# Patient Record
Sex: MALE | Race: BLACK OR AFRICAN AMERICAN | NOT HISPANIC OR LATINO | URBAN - METROPOLITAN AREA
[De-identification: names, ages, dates, MRNs, and addresses within clinical notes are randomized per-mention and may not be internally consistent; named-entity substitution may affect disease eponyms.]

---

## 2022-02-02 DIAGNOSIS — Z01.818 PREOP EXAMINATION: Primary | ICD-10-CM

## 2022-02-02 DIAGNOSIS — N18.6 END STAGE RENAL DISEASE (CMS/HCC): ICD-10-CM

## 2022-02-04 ENCOUNTER — SOCIAL WORK (OUTPATIENT)
Dept: TRANSPLANT | Facility: HOSPITAL | Age: 57
End: 2022-02-04
Payer: COMMERCIAL

## 2022-03-03 ENCOUNTER — APPOINTMENT (OUTPATIENT)
Dept: LAB | Facility: HOSPITAL | Age: 57
End: 2022-03-03
Attending: TRANSPLANT SURGERY
Payer: COMMERCIAL

## 2022-03-03 ENCOUNTER — MULTIDISCIPLINARY VISIT (OUTPATIENT)
Dept: TRANSPLANT | Facility: HOSPITAL | Age: 57
End: 2022-03-03
Attending: SURGERY

## 2022-03-03 VITALS
SYSTOLIC BLOOD PRESSURE: 102 MMHG | HEIGHT: 67 IN | BODY MASS INDEX: 34.84 KG/M2 | WEIGHT: 222 LBS | RESPIRATION RATE: 12 BRPM | HEART RATE: 86 BPM | DIASTOLIC BLOOD PRESSURE: 72 MMHG

## 2022-03-03 DIAGNOSIS — Z01.818 PRE-TRANSPLANT EVALUATION FOR KIDNEY TRANSPLANT: Primary | ICD-10-CM

## 2022-03-03 DIAGNOSIS — Z01.818 PRE-TRANSPLANT EVALUATION FOR KIDNEY TRANSPLANT: ICD-10-CM

## 2022-03-03 LAB
ABO + RH BLD: NORMAL
ALBUMIN SERPL-MCNC: 3.9 G/DL (ref 3.4–5)
ALP SERPL-CCNC: 68 IU/L (ref 35–126)
ALT SERPL-CCNC: 21 IU/L (ref 16–63)
ANION GAP SERPL CALC-SCNC: 20 MEQ/L (ref 3–15)
AST SERPL-CCNC: 17 IU/L (ref 15–41)
BASOPHILS # BLD: 0.06 K/UL (ref 0.01–0.1)
BASOPHILS NFR BLD: 0.6 %
BILIRUB SERPL-MCNC: 0.5 MG/DL (ref 0.3–1.2)
BUN SERPL-MCNC: 59 MG/DL (ref 8–20)
CALCIUM SERPL-MCNC: 10.3 MG/DL (ref 8.9–10.3)
CALCIUM SERPL-MCNC: 10.3 MG/DL (ref 8.9–10.3)
CHLORIDE SERPL-SCNC: 93 MEQ/L (ref 98–109)
CHOLEST SERPL-MCNC: 288 MG/DL
CO2 SERPL-SCNC: 25 MEQ/L (ref 22–32)
CREAT SERPL-MCNC: 6.8 MG/DL (ref 0.8–1.3)
D AG BLD QL: POSITIVE
DIFFERENTIAL METHOD BLD: ABNORMAL
EOSINOPHIL # BLD: 0.64 K/UL (ref 0.04–0.54)
EOSINOPHIL NFR BLD: 6.7 %
ERYTHROCYTE [DISTWIDTH] IN BLOOD BY AUTOMATED COUNT: 16.9 % (ref 11.6–14.4)
GFR SERPL CREATININE-BSD FRML MDRD: 10.3 ML/MIN/1.73M*2
GLUCOSE SERPL-MCNC: 83 MG/DL (ref 70–99)
HCT VFR BLDCO AUTO: 33 % (ref 40.1–51)
HDLC SERPL-MCNC: 87 MG/DL
HDLC SERPL: 3.3 {RATIO}
HGB BLD-MCNC: 11.4 G/DL (ref 13.7–17.5)
IMM GRANULOCYTES # BLD AUTO: 0.05 K/UL (ref 0–0.08)
IMM GRANULOCYTES NFR BLD AUTO: 0.5 %
INR PPP: 1.1
LABORATORY COMMENT REPORT: NORMAL
LDLC SERPL CALC-MCNC: 178 MG/DL
LYMPHOCYTES # BLD: 2.25 K/UL (ref 1.2–3.5)
LYMPHOCYTES NFR BLD: 23.7 %
MCH RBC QN AUTO: 28.7 PG (ref 28–33.2)
MCHC RBC AUTO-ENTMCNC: 34.5 G/DL (ref 32.2–36.5)
MCV RBC AUTO: 83.1 FL (ref 83–98)
MONOCYTES # BLD: 0.9 K/UL (ref 0.3–1)
MONOCYTES NFR BLD: 9.5 %
NEUTROPHILS # BLD: 5.6 K/UL (ref 1.7–7)
NEUTS SEG NFR BLD: 59 %
NONHDLC SERPL-MCNC: 201 MG/DL
NRBC BLD-RTO: 0 %
PDW BLD AUTO: 11 FL (ref 9.4–12.4)
PLATELET # BLD AUTO: 236 K/UL (ref 150–350)
POTASSIUM SERPL-SCNC: 4.2 MEQ/L (ref 3.6–5.1)
PROT SERPL-MCNC: 7.9 G/DL (ref 6–8.2)
PROTHROMBIN TIME: 13.4 SEC (ref 12.2–14.5)
PSA SERPL-MCNC: 0.73 NG/ML
PTH-INTACT SERPL-MCNC: 789.7 PG/ML (ref 12–88)
RBC # BLD AUTO: 3.97 M/UL (ref 4.5–5.8)
SODIUM SERPL-SCNC: 138 MEQ/L (ref 136–144)
TRIGL SERPL-MCNC: 116 MG/DL (ref 30–149)
WBC # BLD AUTO: 9.5 K/UL (ref 3.8–10.5)

## 2022-03-03 PROCEDURE — 86706 HEP B SURFACE ANTIBODY: CPT

## 2022-03-03 PROCEDURE — 36415 COLL VENOUS BLD VENIPUNCTURE: CPT

## 2022-03-03 PROCEDURE — 86704 HEP B CORE ANTIBODY TOTAL: CPT

## 2022-03-03 PROCEDURE — 85025 COMPLETE CBC W/AUTO DIFF WBC: CPT

## 2022-03-03 PROCEDURE — 83970 ASSAY OF PARATHORMONE: CPT

## 2022-03-03 PROCEDURE — 86480 TB TEST CELL IMMUN MEASURE: CPT

## 2022-03-03 PROCEDURE — 86803 HEPATITIS C AB TEST: CPT

## 2022-03-03 PROCEDURE — 86787 VARICELLA-ZOSTER ANTIBODY: CPT

## 2022-03-03 PROCEDURE — 86900 BLOOD TYPING SEROLOGIC ABO: CPT

## 2022-03-03 PROCEDURE — 85610 PROTHROMBIN TIME: CPT

## 2022-03-03 PROCEDURE — 87340 HEPATITIS B SURFACE AG IA: CPT

## 2022-03-03 PROCEDURE — 86645 CMV ANTIBODY IGM: CPT

## 2022-03-03 PROCEDURE — 80307 DRUG TEST PRSMV CHEM ANLYZR: CPT

## 2022-03-03 PROCEDURE — G0480 DRUG TEST DEF 1-7 CLASSES: HCPCS

## 2022-03-03 PROCEDURE — 80061 LIPID PANEL: CPT

## 2022-03-03 PROCEDURE — 87389 HIV-1 AG W/HIV-1&-2 AB AG IA: CPT

## 2022-03-03 PROCEDURE — 86665 EPSTEIN-BARR CAPSID VCA: CPT

## 2022-03-03 PROCEDURE — 80053 COMPREHEN METABOLIC PANEL: CPT

## 2022-03-03 PROCEDURE — 86592 SYPHILIS TEST NON-TREP QUAL: CPT

## 2022-03-03 PROCEDURE — 86644 CMV ANTIBODY: CPT

## 2022-03-03 PROCEDURE — 84153 ASSAY OF PSA TOTAL: CPT

## 2022-03-03 RX ORDER — GABAPENTIN 100 MG/1
100 CAPSULE ORAL NIGHTLY
COMMUNITY
Start: 2022-01-24

## 2022-03-03 RX ORDER — METOPROLOL SUCCINATE 100 MG/1
TABLET, EXTENDED RELEASE ORAL
COMMUNITY
Start: 2021-12-09

## 2022-03-03 RX ORDER — SUCROFERRIC OXYHYDROXIDE 500 MG/1
TABLET, CHEWABLE ORAL
COMMUNITY
Start: 2022-02-03 | End: 2022-04-28

## 2022-03-03 RX ORDER — NIFEDIPINE 30 MG/1
30 TABLET, EXTENDED RELEASE ORAL DAILY
COMMUNITY
Start: 2022-01-18 | End: 2022-05-18

## 2022-03-03 NOTE — PROGRESS NOTES
CC: End Stage Renal Disease for renal transplant evaluation    HPI:    Mr. Elaine Holguin is a 56 y.o. male with a hx of ESRD secondary to HTN.  Patient is seen today for evaluation for renal transplant. Patient is on hemodialysis.  He started dialysis about a month ago when he was hospitalized with COVID.  He was diagnosed with kidney disease in 2014.   Otherwise no changes to his health status.  He makes a normal amount of urine daily.     His past medical history is otherwise unremarkable.  No past medical history.        Past Medical History:   Diagnosis Date   • COVID-19 vaccine series started     1 dose as of 3/3/22   • ESRD (end stage renal disease) (CMS/[facility])    • History of anemia due to CKD    • Hypertensive nephropathy    • Pneumonia due to COVID-19 virus        No past surgical history on file.      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    Tobacco Use   • Smoking status: Never Smoker   • Smokeless tobacco: Not on file   Substance and Sexual Activity   • Alcohol use: Not Currently   • Drug use: Never   • Sexual activity: Not on file   Other Topics Concern   • Not on file   Social History Narrative   • Not on file     Social Determinants of Health     Financial Resource Strain: Not on file   Food Insecurity: Not on file   Transportation Needs: Not on file   Physical Activity: Not on file   Stress: Not on file   Social Connections: Not on file   Intimate Partner Violence: Not on file   Housing Stability: Not on file       No Known Allergies      Elaine Holguin had no medications administered during this  visit.      Review of Systems   Constitutional: Negative for  chills, and fever. No Fatigue  HENT: Negative for congestion, dental problem, ear discharge, ear pain, facial swelling, hearing loss and mouth sores.    Eyes: Negative for pain, discharge, itching and visual disturbance.   Respiratory: Negative for cough, chest tightness, shortness of breath, wheezing and stridor.    Cardiovascular: Negative for chest pain, palpitations and leg swelling.   Gastrointestinal: Negative for abdominal distention, abdominal pain, blood in stool, constipation, diarrhea, nausea and vomiting.   Genitourinary: Negative for decreased urine volume, difficulty urinating, dysuria, flank pain and hematuria.   Musculoskeletal: Negative for back pain, joint swelling and neck pain.   Skin: Negative for color change, pallor, rash and wound.   Neurological: Negative for dizziness, seizures, syncope, weakness and headaches.   Hematological: Does not bruise/bleed easily.   Psychiatric/Behavioral: Negative for agitation, behavioral problems, confusion and decreased concentration.       Physical Exam    Vitals:    03/03/22 1054   BP: 102/72   Pulse: 86   Resp: 12       Physical Exam   Constitutional: He appears well-developed and well-nourished. No distress.   HENT:   Head: Normocephalic and atraumatic.   Right Ear: External ear normal.   Left Ear: External ear normal.   Eyes: Conjunctivae and EOM are normal. Right eye exhibits no discharge. Left eye exhibits no discharge.   Neck: Normal range of motion. Neck supple. No tracheal deviation present.   Cardiovascular: Normal rate, regular rhythm, normal heart sounds and strong distal pulses.  Exam reveals no gallop and no friction rub.  No carotid bruits  No murmur heard.  Pulmonary/Chest: Effort normal and breath sounds normal. No stridor. No respiratory distress. he has no wheezes. He has no rales. he exhibits no tenderness.   Abdominal: Soft. he exhibits no mass. There is no tenderness. There is  no rebound and no guarding.   Musculoskeletal: Normal range of motion. he exhibits no edema or deformity.   Neurological: he is alert. No cranial nerve deficit or sensory deficit. Coordination normal.   Skin: Skin is warm and dry. Capillary refill takes less than 2 seconds. No rash noted. he  is not diaphoretic. No erythema. No pallor.   Psychiatric: he has a normal mood and affect. his behavior is normal. Judgment and thought content normal.       Assessment and Plan     Elaine Holguin is a 56 y.o. male with ESRD 2/2 HTN here today for renal transplant evaluation.      At this time he is likely a great candidate for transplant.  He had an echocardiogram earlier this year that should a normal EF.  He will need a stress test and standard work up with cancer screening.       I have reviewed the surgical procedure and its associated risks, national and center specific outcomes for recipients, alternative treatments for the kidney transplant recipients and the right to refuse transplantation. Furthermore we discussed the use of HepB core Antibody donors, DCD donors, PHS High Risk Donors, and KDPI > 85% donors.  Given his age, I would not advocate for high KDPI kidneys for him at this time.  It was a pleasure to see Elaine Holguin in clinic today. If you have any questions or concerns, please feel free to contact me.

## 2022-03-03 NOTE — PROGRESS NOTES
I met with the patient and his wife saad about living donation.  We discussed the advantages of living donor transplant, the donor surgery, recovery period, and evaluation process. I provided educational material on donation and my contact info.

## 2022-03-04 LAB
EBV NA 1 IGG SER-ACNC: 5.77
EBV VCA IGG SER IA-ACNC: 3.83
EBV VCA IGM SER IA-ACNC: 0.09
HBV CORE AB SER QL: REACTIVE
HBV SURFACE AB SER QL: REACTIVE
HBV SURFACE AG SER QL: NONREACTIVE
HCV AB SER QL: NONREACTIVE
HIV 1+2 AB+HIV1 P24 AG SERPL QL IA: NONREACTIVE
INTERPRETATION: ABNORMAL
RPR SER QL: NORMAL
SCAN RESULT - ADL: NORMAL
SCAN RESULT - ADL: NORMAL
VZV IGG SER-ACNC: 622 AU/ML

## 2022-03-07 LAB
M TB IFN-G BLD-IMP: NEGATIVE
MITOGEN-NIL: >10 IU/ML
NIL: 0.02 IU/ML
TB AG-NIL: 0.01 IU/ML
TB2 AG - NIL: 0.01 IU/ML

## 2022-03-08 ENCOUNTER — COMMITTEE REVIEW (OUTPATIENT)
Dept: TRANSPLANT | Facility: HOSPITAL | Age: 57
End: 2022-03-08
Payer: COMMERCIAL

## 2022-03-08 NOTE — COMMITTEE REVIEW
Patient Discussion Note     Evaluation Date: 3/3/2022  Committee Review Date: 3/8/2022    Organ being evaluated for: Kidney    Transplant Phase: Evaluation  Transplant Status: Active    EPTS: 21 at 3/8/2022  2:14 PM  Calculated from:  Age: 56 years  Has Diabetes: No  Prior solid organ transplant: No  On dialysis: No    Transplant Coordinator: Shirley Mcmullen  Transplant Surgeon:       Referring Physician: Miky Floewr    Primary Diagnosis: Hypertensive Nephrosclerosis  Secondary Diagnosis:     Committee Review Members:  Nephrology Jonathon Ortiz MD    Carolyne García   Transplant Maribell Lopez, Shirley Mcmullen, ALEJANDRA, Liza Fleming RN, OTTO Brady, Bria Munoz   Transplant Surgery Gaudencio Humphreys MD     Nephrology, , Transplant, Transplant Surgery      Transplant Eligibility: Irreversible renal failure defined as GFR < 20cc/minute/1.73m2, Willing to undergo evaluation, Willingness to comply with medical managment important to health    Committee Review Decision: Approved List Status 7    Relative Contraindications: None    Absolute Contraindications: None    Committee Discussion Details: NOD. Routine testing, cards, stress, routine cancer screenings prior to Stat 1 listing.

## 2022-03-09 DIAGNOSIS — Z01.818 PRE-TRANSPLANT EVALUATION FOR KIDNEY TRANSPLANT: Primary | ICD-10-CM

## 2022-03-09 LAB
CMV IGG SERPL IA-ACNC: 0.21
CMV IGM SERPL IA-ACNC: 0.15

## 2022-03-11 ENCOUNTER — TELEPHONE (OUTPATIENT)
Dept: TRANSPLANT | Facility: HOSPITAL | Age: 57
End: 2022-03-11
Payer: COMMERCIAL

## 2022-03-11 RX ORDER — CALCITRIOL 0.5 UG/1
1 CAPSULE ORAL EVERY OTHER DAY
COMMUNITY
End: 2022-04-28

## 2022-03-11 NOTE — TELEPHONE ENCOUNTER
Informed pt of HBcAB positivity and need for PCR testing. I also informed him that PTH is high. He tells me he thinks he is receiving therapy for this at dialysis. HD confirms he is taking calcitriol 1 mcg at every session. Med rec updated. .

## 2022-03-16 ENCOUNTER — TELEPHONE (OUTPATIENT)
Dept: CARDIOLOGY | Facility: CLINIC | Age: 57
End: 2022-03-16

## 2022-03-16 NOTE — TELEPHONE ENCOUNTER
I called pt and left vm letting him know that Dr Henderson's 1st available for a NPV is  07/05 @ 1:00pm. I aksed pt to c/b to conf if this is ok for him.

## 2022-04-07 NOTE — TELEPHONE ENCOUNTER
Patient returning Kims call. Patient accepts the appt 7/5/22 at 1:00pm     Patient can be reached at [phone]

## 2022-04-27 ENCOUNTER — TELEPHONE (OUTPATIENT)
Dept: TRANSPLANT | Facility: HOSPITAL | Age: 57
End: 2022-04-27
Payer: COMMERCIAL

## 2022-04-28 ENCOUNTER — OFFICE VISIT (OUTPATIENT)
Dept: COLORECTAL SURGERY | Facility: CLINIC | Age: 57
End: 2022-04-28
Payer: COMMERCIAL

## 2022-04-28 ENCOUNTER — HOSPITAL ENCOUNTER (OUTPATIENT)
Dept: RADIOLOGY | Facility: HOSPITAL | Age: 57
Discharge: HOME | End: 2022-04-28
Attending: TRANSPLANT SURGERY

## 2022-04-28 ENCOUNTER — APPOINTMENT (OUTPATIENT)
Dept: LAB | Facility: HOSPITAL | Age: 57
End: 2022-04-28
Attending: TRANSPLANT SURGERY

## 2022-04-28 ENCOUNTER — OFFICE VISIT (OUTPATIENT)
Dept: CARDIOLOGY | Facility: CLINIC | Age: 57
End: 2022-04-28
Payer: COMMERCIAL

## 2022-04-28 ENCOUNTER — HOSPITAL ENCOUNTER (OUTPATIENT)
Facility: HOSPITAL | Age: 57
End: 2022-04-28
Attending: COLON & RECTAL SURGERY | Admitting: COLON & RECTAL SURGERY
Payer: COMMERCIAL

## 2022-04-28 VITALS — HEIGHT: 70 IN | BODY MASS INDEX: 32.35 KG/M2 | WEIGHT: 226 LBS

## 2022-04-28 VITALS
SYSTOLIC BLOOD PRESSURE: 98 MMHG | WEIGHT: 226.8 LBS | DIASTOLIC BLOOD PRESSURE: 64 MMHG | HEIGHT: 69 IN | HEART RATE: 93 BPM | RESPIRATION RATE: 16 BRPM | OXYGEN SATURATION: 98 % | BODY MASS INDEX: 33.59 KG/M2

## 2022-04-28 DIAGNOSIS — Z12.11 COLON CANCER SCREENING: Primary | ICD-10-CM

## 2022-04-28 DIAGNOSIS — E78.5 HYPERLIPIDEMIA, UNSPECIFIED HYPERLIPIDEMIA TYPE: ICD-10-CM

## 2022-04-28 DIAGNOSIS — Z01.818 PRE-TRANSPLANT EVALUATION FOR KIDNEY TRANSPLANT: ICD-10-CM

## 2022-04-28 DIAGNOSIS — Z76.82 KIDNEY TRANSPLANT CANDIDATE: ICD-10-CM

## 2022-04-28 DIAGNOSIS — N18.6 ESRD ON DIALYSIS (CMS/HCC): ICD-10-CM

## 2022-04-28 DIAGNOSIS — I12.9 HYPERTENSIVE NEPHROPATHY: Primary | ICD-10-CM

## 2022-04-28 DIAGNOSIS — I70.0 AORTIC CALCIFICATION (CMS/HCC): ICD-10-CM

## 2022-04-28 DIAGNOSIS — I51.7 LEFT VENTRICULAR HYPERTROPHY: ICD-10-CM

## 2022-04-28 DIAGNOSIS — Z99.2 ESRD ON DIALYSIS (CMS/HCC): ICD-10-CM

## 2022-04-28 PROCEDURE — 3008F BODY MASS INDEX DOCD: CPT | Performed by: INTERNAL MEDICINE

## 2022-04-28 PROCEDURE — 99202 OFFICE O/P NEW SF 15 MIN: CPT

## 2022-04-28 PROCEDURE — 76775 US EXAM ABDO BACK WALL LIM: CPT

## 2022-04-28 PROCEDURE — 36415 COLL VENOUS BLD VENIPUNCTURE: CPT

## 2022-04-28 PROCEDURE — 99204 OFFICE O/P NEW MOD 45 MIN: CPT | Performed by: INTERNAL MEDICINE

## 2022-04-28 PROCEDURE — 86900 BLOOD TYPING SEROLOGIC ABO: CPT

## 2022-04-28 PROCEDURE — 3008F BODY MASS INDEX DOCD: CPT

## 2022-04-28 PROCEDURE — 71046 X-RAY EXAM CHEST 2 VIEWS: CPT

## 2022-04-28 RX ORDER — LIDOCAINE AND PRILOCAINE 25; 25 MG/G; MG/G
CREAM TOPICAL
COMMUNITY
Start: 2022-03-18 | End: 2022-04-28

## 2022-04-28 RX ORDER — VIT C/E/ZN/COPPR/LUTEIN/ZEAXAN 250MG-90MG
10000 CAPSULE ORAL DAILY
Qty: 90 CAPSULE | Refills: 3 | COMMUNITY
Start: 2022-04-28

## 2022-04-28 RX ORDER — CINACALCET 30 MG/1
TABLET, FILM COATED ORAL
COMMUNITY
Start: 2022-03-31 | End: 2022-04-28

## 2022-04-28 RX ORDER — HYDROCHLOROTHIAZIDE 50 MG/1
50 TABLET ORAL DAILY
COMMUNITY
End: 2022-04-28

## 2022-04-28 NOTE — PROGRESS NOTES
HISTORY & PHYSICAL EXAM  CC: Colonoscopy    HPI: Elaine is a very pleasant 56 year old male with a past medical history of CKD stage 4 and hypertensive nephropathy,  who is here for a screening colonoscopy. He comes today with his mohinder wife, Bella. He was referred to us by his transplant team and this will be his first colonoscopy. He reports he has 1-2 BM/day well formed and with good control. He denies N/V, bloating, abdominal pain, constipation, diarrhea, rectal pain, rectal bleeding, unintentional weight loss, fecal and urinary incontinence.      Past Medical History:   Diagnosis Date   • COVID-19 vaccine series started     1 dose as of 3/3/22   • ESRD (end stage renal disease) (CMS/[facility])    • History of anemia due to CKD    • Hypertensive nephropathy    • Pneumonia due to COVID-19 virus      History reviewed. No pertinent surgical history.    Current Outpatient Medications:   •  calcitrioL (ROCALTROL) 0.5 mcg capsule, Take 1 mcg by mouth every other day., Disp: , Rfl:   •  cinacalcet (SENSIPAR) 30 mg tablet, , Disp: , Rfl:   •  gabapentin (NEURONTIN) 100 mg capsule, Take 100 mg by mouth nightly., Disp: , Rfl:   •  hydrochlorothiazide (HYDRODIURIL) 50 mg tablet, Take 50 mg by mouth daily., Disp: , Rfl:   •  lidocaine-prilocaine (EMLA) cream, APPLY SMALL AMOUNT TO ACCESS SITE (AVF) 1 TO 2 HOURS BEFORE DIALYSIS. COVER WITH OCCLUSIVE DRESSING (SARAN WRAP), Disp: , Rfl:   •  metoprolol succinate XL (TOPROL-XL) 100 mg 24 hr tablet, TAKE ONE TABLET BY MOUTH ONCE DAILY FOR BLOOD PRESSURE, Disp: , Rfl:   •  NIFEdipine XL (PROCARDIA XL) 30 mg 24 hr tablet, Take 30 mg by mouth daily., Disp: , Rfl:   •  sucroferric oxyhydroxide (VELPHORO) 500 mg tablet,chewable, CRUSH OR CHEW AND SWALLOW 1 TABLET 3 TIMES A DAY WITH MEALS, Disp: , Rfl:    No Known Allergies  Social History     Socioeconomic History   • Marital status:      Spouse name: Not on file   • Number of children: Not on file   • Years of education: Not on  file   • Highest education level: Not on file   Occupational History   • Occupation: SSD     Comment: former   at  retirment home    Tobacco Use   • Smoking status: Never Smoker   • Smokeless tobacco: Not on file   Substance and Sexual Activity   • Alcohol use: Not Currently   • Drug use: Never   • Sexual activity: Not on file   Other Topics Concern   • Not on file   Social History Narrative   • Not on file     Social Determinants of Health     Financial Resource Strain: Not on file   Food Insecurity: Not on file   Transportation Needs: Not on file   Physical Activity: Not on file   Stress: Not on file   Social Connections: Not on file   Intimate Partner Violence: Not on file   Housing Stability: Not on file      Family History   Problem Relation Age of Onset   • Stroke Biological Mother    • Throat cancer Biological Father    Patient notes his Brother was diagnosed with cancer,but he is not sure what type.     REVIEW OF SYSTEMS: Pertinent positives and negatives as mentioned in HPI.     PHYSICAL EXAM:  GEN: On examination the patient is resting comfortably.  NEURO/PSYCH: Alert and oriented ×3  HEENT: Eyes: Sclera anicteric  CHEST: Equal rise and fall the chest.  No tenderness bilaterally.  SKIN: Warm and moist  NODES: No groin or cervical lymphadenopathy  EXT: No palpable edema of the bilateral lower extremities.  No clubbing.  GI: Abdomen soft, nontender, nondistended.  No palpable liver or spleen edge.  No ventral hernias, mass, or tenderness.  RECTAL: Deferred until time of colonoscopy.       ASSESSMENT/PLAN:     Colon cancer screening  We have made arrangements to get him scheduled for a colonoscopy today. We went over the risks of the procedure, including bleeding and perforation, he understood and wishes to proceed.

## 2022-04-28 NOTE — LETTER
"2022     Jamie Jerome MD  [address]  [place], [facility] [address]  Kidney Transplant Program  [facility] [address]    Patient: Elaine Holguin  YOB: 1965  Date of Visit: 2022      Dear Dr. Jerome:    Thank you for referring Elaine Holguin to me for evaluation. Below are my notes for this consultation.    If you have questions, please do not hesitate to call me. I look forward to following your patient along with you.         Sincerely,        Jesse Hicks,         CC: Elmer Mercedes, DO Miky Flower, Jesse Lo DO  2022 11:01 PM  Signed       Jesse Hicks D.O., [facility]    Clinical Cardiology and Heart Failure     [facility] HEART [facility]  [facility]  [facility] Level  [address] [place] [address]     TEL  [phone]  [facility]       2022    Re:  Elaine Holguin  : 1965    Dear Leo Jerome MD:    Thank you for your referral of Elaine Holguin for pre-operative cardiovascular evaluation for the kidney transplant team.    \"Ace\" presented for his initial cardiovascular evaluation as he prepares for kidney transplant.  He follows with his nephrologist Miky Flower and his PCP/cardiologist Elmer Mercedes.  He has a history of hypertension, Covid-19 infection in 2022, and anemia of CKD.  He also has a history of ESRD secondary to hypertensive nephrosclerosis.  He started dialysis in 2022 when he was hospitalized with COVID.      He is NYHA class I.  He notes being sedentary since starting in dialysis earlier this year.  He denies angina or breathlessness with usual activity level.  He notes that his blood pressure has been low at times post dialysis.  He denies orthopnea, paroxysmal nocturnal dyspnea, abdominal bloating, peripheral edema, palpitations, presyncope, syncope, stroke, TIA or claudication.    PAST MEDICAL HISTORY:  All aspects of this " documentation have been updated and/or entered into our EHR.  hypertension, Covid-19 infection in 2022, anemia of CKD, ESRD secondary to hypertensive nephrosclerosis, left upper extremity fistula.    SOCIAL HISTORY: He is  to his wife Bella. He has no children.  He is on disability.  He formerly worked at the  at a [employer].  He never smoked tobacco.  He does not drink alcohol.    FAMILY HISTORY: His mother is  from a stroke.  His father is  from an unknown type of cancer.  He has 5 brothers, 3 of which are  from unknown causes and other 2 with unknown medical history.  He also has 3 sisters who all are alive with unknown medical history.    MEDICATIONS:     Outpatient Encounter Medications as of 2022:   •  cholecalciferol (VITAMIN D3) 250 mcg (10,000 unit) capsule, Take 1 capsule (10,000 Units total) by mouth daily.  •  gabapentin (NEURONTIN) 100 mg capsule, Take 100 mg by mouth nightly.  •  metoprolol succinate XL (TOPROL-XL) 100 mg 24 hr tablet, TAKE ONE TABLET BY MOUTH ONCE DAILY FOR BLOOD PRESSURE  •  NIFEdipine XL (PROCARDIA XL) 30 mg 24 hr tablet, Take 30 mg by mouth daily.  •  [DISCONTINUED] calcitrioL (ROCALTROL) 0.5 mcg capsule, Take 1 mcg by mouth every other day.  •  [DISCONTINUED] cinacalcet (SENSIPAR) 30 mg tablet,   •  [DISCONTINUED] hydrochlorothiazide (HYDRODIURIL) 50 mg tablet, Take 50 mg by mouth daily.  •  [DISCONTINUED] lidocaine-prilocaine (EMLA) cream, APPLY SMALL AMOUNT TO ACCESS SITE (AVF) 1 TO 2 HOURS BEFORE DIALYSIS. COVER WITH OCCLUSIVE DRESSING (SARAN WRAP)  •  [DISCONTINUED] sucroferric oxyhydroxide (VELPHORO) 500 mg tablet,chewable, CRUSH OR CHEW AND SWALLOW 1 TABLET 3 TIMES A DAY WITH MEALS    ALLERGIES: No known drug allergies    REVIEW OF SYSTEMS: Aside from what is mentioned above, a 14 point review of systems was performed and was negative.    PHYSICAL EXAMINATION:  Visit Vitals  BP 98/64   Pulse 93   Resp 16   Ht  "1.753 m (5' 9\")   Wt 103 kg (226 lb 12.8 oz)   SpO2 98%   BMI 33.49 kg/m²   Body surface area is 2.24 meters squared.   MAP 75 mmHg  General: Pleasant and in no acute distress.  HEENT: No corneal arcus or xanthelasmas.  Sclerae are anicteric.  Nares patent.  Mucous membranes moist.  Neck: Supple.  JVP is 5 cm/H2O.  Carotids are equal with no audible bruits.  No lymphadenopathy or thyromegaly.  Heart: Regular.  Normal S1 and S2.  No S4. No S3. No murmur.  Chest: Symmetrical. Right upper chest dialysis catheter.  Lungs: Clear bilaterally without rales, wheezes nor rhonchi.  Abdomen: Soft, nontender.  No masses or bruits.  No organomegaly.  Normal bowel sounds.  Extremities: No cyanosis, clubbing or edema.  Distal pulses are easily palpable.  Left forearm AV fistula without palpable thrill or audible bruit,  Skin: Warm and dry and well perfused.  Neurologic: Alert and oriented ×3.  Cranial nerves II through XII are intact.  Psychiatric: Normal mood, affect & judgment.    DIAGNOSTIC DATA:    EKG: Sinus rhythm, early repolarization.    Labs:   Lab Results   Component Value Date     03/03/2022    K 4.2 03/03/2022    BUN 59 (H) 03/03/2022    CREATININE 6.8 (HH) 03/03/2022    EGFR 10.3 (L) 03/03/2022    WBC 9.50 03/03/2022    HGB 11.4 (L) 03/03/2022     03/03/2022    AST 17 03/03/2022    ALT 21 03/03/2022    GLUCOSE 83 03/03/2022    INR 1.1 03/03/2022       Lipid Profile:   Lab Results   Component Value Date    CHOL 288 (H) 03/03/2022     Lab Results   Component Value Date    TRIG 116 03/03/2022     Lab Results   Component Value Date    HDL 87 03/03/2022     Lab Results   Component Value Date    LDLCALC 178 (H) 03/03/2022       Echocardiogram 1/12/2022: Performed at [facility]  Left Ventricle: Normal left ventricular size. Moderate left ventricular hypertrophy. Normal left ventricular systolic function. LV Ejection Fraction was 68 %.   Diastolic Function: Diastolic function indeterminate.   Right " Ventricle: Normal right ventricular systolic function. Normal right ventricular size. TAPSE 1.9 cm.   Left Atrium: The left atrium is normal in size.   Right Atrium: The right atrium is normal in size.   Atrial Septum: Not well visualized.   Mitral Valve: Mild mitral annular calcification. Trivial mitral regurgitation.   Aortic Valve: Trileaflet aortic valve. Aortic cusps appear mildly calcified. Mild aortic regurgitation.   Tricuspid Valve: Normal appearance of the tricuspid valve. There is mild tricuspid regurgitation.   Tricuspid regurgitation peak pressure gradient was 19 mmHg.   Pulmonic Valve: Normal appearance of the pulmonic valve. There is trace pulmonic regurgitation.   Pericardium: Small pericardial effusion. The pericardial effusion was circumferential. No echocardiographic evidence to suggest pericardial tamponade.   Aorta: All visualized segments of Aorta normal.   IVC: Normal size IVC with respiratory collapse consistent with a right atrial pressure of 3 mmHg. IVC diameter 1.0 cm.       IMPRESSION/RECOMMENDATIONS:  1. Preprocedural cardiovascular risk stratification prior to kidney transplantation - Ace has a history of hypertension and ESRD on dialysis since January 2022.  He has no history of CAD/MI, CVA or DM.  He is NYHA class I.  His echocardiogram from January 2022 showed preserved LV systolic function.  He is acceptable risk for listing.  I will arrainge for an exercise nuclear stress test. He will not take metoprolol on the evening prior to testing.    2. Hypertension - his blood pressure is on the low side but his MAP is > 70 mmHg.  He is on metoprolol and nifedipine.  He is no longer on hydrochlorothiazide.  This is followed by Miky Flower and Elmer Mercedes.   3. Moderate left ventricular hypertrophy - this is due to long standing hypertension.    4. Mild aortic calcification - his echocardiogram in January 2022 showed his aortic cusps appear mildly calcified. He only has mild aortic  regurgitation.   5. Covid-19 infection in 2022 - he fortunately did not require intubation.    6. Anemia of CKD - he previously was on Velphoro.  This is followed by Miky Flower.  7. ESRD on dialysis - this is secondary to hypertensive nephrosclerosis.  He started dialysis in 2022 when he was hospitalized with COVID.  This is followed by Miky Flower.    Leo, thank you for allowing me to share in the care of your patient.   I asked Ace to return in 1 year for in-house transplant cardiac clearance but to follow with his primary cardiologist, Dr. Elmer Mercedes, for all other cardiac needs.  If you have any further questions, please do not hesitate to contact me.    Sincerely,    OTTO Espinoza / Jesse Hicks D.O., [facility]    I, Jesse Hicks D.O., personally saw and examined the patient and helped to formulate the plan outlined above. I reviewed available interim notes, studies, labs and communications where appropriate.  Edits were made where appropriate.                        Alyssa Reynolds CRNP  2022  8:54 PM  Incomplete       Jesse Hicks D.O., [facility]    Clinical Cardiology and Heart Failure     [facility] HEART [facility]  The Heart [facility] Level  [address] [place] [address]     TEL  [phone]  [facility]       2022    Re:  Elaine Holguin  : 1965    Dear ***:    ***    PAST MEDICAL HISTORY:  All aspects of this documentation have been updated and/or entered into our EHR. ***     SOCIAL HISTORY: ***    FAMILY HISTORY: ***    MEDICATIONS:     Outpatient Encounter Medications as of 2022:   •  calcitrioL (ROCALTROL) 0.5 mcg capsule, Take 1 mcg by mouth every other day.  •  gabapentin (NEURONTIN) 100 mg capsule, Take 100 mg by mouth nightly.  •  metoprolol succinate XL (TOPROL-XL) 100 mg 24 hr tablet, TAKE ONE TABLET BY MOUTH ONCE DAILY FOR BLOOD  PRESSURE  •  NIFEdipine XL (PROCARDIA XL) 30 mg 24 hr tablet, Take 30 mg by mouth daily.  •  sucroferric oxyhydroxide (VELPHORO) 500 mg tablet,chewable, CRUSH OR CHEW AND SWALLOW 1 TABLET 3 TIMES A DAY WITH MEALS    ALLERGIES: ***    REVIEW OF SYSTEMS: Aside from what is mentioned above, a 14 point review of systems was performed and was negative.    PHYSICAL EXAMINATION:  There were no vitals taken for this visit.There is no height or weight on file to calculate BSA.  General: Pleasant and in no acute distress.  HEENT: No corneal arcus or xanthelasmas.  Sclerae are anicteric.  Nares patent.  Mucous membranes moist.  Neck: Supple.  JVP is *** cm/H2O.  Carotids are equal with no audible bruits.  No lymphadenopathy or thyromegaly.  Heart: Regular.  Normal S1 and S2.  No S4. No S3. No murmur.  Chest: Symmetrical.  Lungs: Clear bilaterally without rales, wheezes nor rhonchi.  Abdomen: Soft, nontender.  No masses or bruits.  No organomegaly.  Normal bowel sounds.  Extremities: No cyanosis, clubbing or edema.  Distal pulses are easily palpable.  Skin: Warm and dry and well perfused.  Neurologic: Alert and oriented ×3.  Cranial nerves II through XII are intact.  Psychiatric: Normal mood, affect & judgment.    DIAGNOSTIC DATA:    EKG: ***    Labs:   Lab Results   Component Value Date     03/03/2022    K 4.2 03/03/2022    BUN 59 (H) 03/03/2022    CREATININE 6.8 (HH) 03/03/2022    EGFR 10.3 (L) 03/03/2022    WBC 9.50 03/03/2022    HGB 11.4 (L) 03/03/2022     03/03/2022    AST 17 03/03/2022    ALT 21 03/03/2022    GLUCOSE 83 03/03/2022    INR 1.1 03/03/2022       Lipid Profile:   Lab Results   Component Value Date    CHOL 288 (H) 03/03/2022     Lab Results   Component Value Date    TRIG 116 03/03/2022     Lab Results   Component Value Date    HDL 87 03/03/2022     Lab Results   Component Value Date    LDLCALC 178 (H) 03/03/2022           IMPRESSION/RECOMMENDATIONS:  8. Preprocedural cardiovascular risk  stratification prior to kidney transplantation  9.     ***, thank you for allowing me to share in the care of your patient.  I asked ***  to return in ***.  If you have any further questions, please do not hesitate to contact me.    Sincerely,    Jesse Hicks D.O., [facility]

## 2022-04-28 NOTE — PATIENT INSTRUCTIONS
You presented for your initial cardiovascular evaluation for the kidney transplant team.  You had an echocardiogram in January 2022.  Schedule an Exercise Stress Myoview soon. Do not take your metoprolol on the evening/night before your testing.  We will call you with the results.  Return in 1 year Joe

## 2022-04-28 NOTE — LETTER
"2022     Jamie Jerome MD  [address]  [place], [facility] [address]  Kidney Transplant Program  [facility] [address]    Patient: Elaine Holguin  YOB: 1965  Date of Visit: 2022      Dear Dr. Jerome:    Thank you for referring Elaine Holguin to me for evaluation. Below are my notes for this consultation.    If you have questions, please do not hesitate to call me. I look forward to following your patient along with you.         Sincerely,        Jesse Hicks,         CC: Elmer Mercedes, DO Miky Flower, Jesse Lo DO  2022 11:01 PM  Signed       Jesse Hicks D.O., [facility]    Clinical Cardiology and Heart Failure     [facility]ART [facility]  [facility]  [facility] Level  [address] [place] [address]     TEL  [phone]  [facility]       2022    Re:  Elaine Holguin  : 1965    Dear Leo Jerome MD:    Thank you for your referral of Elaine Holguin for pre-operative cardiovascular evaluation for the kidney transplant team.    \"Ace\" presented for his initial cardiovascular evaluation as he prepares for kidney transplant.  He follows with his nephrologist Miky Flower and his PCP/cardiologist Elmer Mercedes.  He has a history of hypertension, Covid-19 infection in 2022, and anemia of CKD.  He also has a history of ESRD secondary to hypertensive nephrosclerosis.  He started dialysis in 2022 when he was hospitalized with COVID.      He is NYHA class I.  He notes being sedentary since starting in dialysis earlier this year.  He denies angina or breathlessness with usual activity level.  He notes that his blood pressure has been low at times post dialysis.  He denies orthopnea, paroxysmal nocturnal dyspnea, abdominal bloating, peripheral edema, palpitations, presyncope, syncope, stroke, TIA or claudication.    PAST MEDICAL HISTORY:  All aspects of this " documentation have been updated and/or entered into our EHR.  hypertension, Covid-19 infection in 2022, anemia of CKD, ESRD secondary to hypertensive nephrosclerosis, left upper extremity fistula.    SOCIAL HISTORY: He is  to his wife Bella. He has no children.  He is on disability.  He formerly worked at the  at a [employer].  He never smoked tobacco.  He does not drink alcohol.    FAMILY HISTORY: His mother is  from a stroke.  His father is  from an unknown type of cancer.  He has 5 brothers, 3 of which are  from unknown causes and other 2 with unknown medical history.  He also has 3 sisters who all are alive with unknown medical history.    MEDICATIONS:     Outpatient Encounter Medications as of 2022:   •  cholecalciferol (VITAMIN D3) 250 mcg (10,000 unit) capsule, Take 1 capsule (10,000 Units total) by mouth daily.  •  gabapentin (NEURONTIN) 100 mg capsule, Take 100 mg by mouth nightly.  •  metoprolol succinate XL (TOPROL-XL) 100 mg 24 hr tablet, TAKE ONE TABLET BY MOUTH ONCE DAILY FOR BLOOD PRESSURE  •  NIFEdipine XL (PROCARDIA XL) 30 mg 24 hr tablet, Take 30 mg by mouth daily.  •  [DISCONTINUED] calcitrioL (ROCALTROL) 0.5 mcg capsule, Take 1 mcg by mouth every other day.  •  [DISCONTINUED] cinacalcet (SENSIPAR) 30 mg tablet,   •  [DISCONTINUED] hydrochlorothiazide (HYDRODIURIL) 50 mg tablet, Take 50 mg by mouth daily.  •  [DISCONTINUED] lidocaine-prilocaine (EMLA) cream, APPLY SMALL AMOUNT TO ACCESS SITE (AVF) 1 TO 2 HOURS BEFORE DIALYSIS. COVER WITH OCCLUSIVE DRESSING (SARAN WRAP)  •  [DISCONTINUED] sucroferric oxyhydroxide (VELPHORO) 500 mg tablet,chewable, CRUSH OR CHEW AND SWALLOW 1 TABLET 3 TIMES A DAY WITH MEALS    ALLERGIES: No known drug allergies    REVIEW OF SYSTEMS: Aside from what is mentioned above, a 14 point review of systems was performed and was negative.    PHYSICAL EXAMINATION:  Visit Vitals  BP 98/64   Pulse 93   Resp 16   Ht  "1.753 m (5' 9\")   Wt 103 kg (226 lb 12.8 oz)   SpO2 98%   BMI 33.49 kg/m²   Body surface area is 2.24 meters squared.   MAP 75 mmHg  General: Pleasant and in no acute distress.  HEENT: No corneal arcus or xanthelasmas.  Sclerae are anicteric.  Nares patent.  Mucous membranes moist.  Neck: Supple.  JVP is 5 cm/H2O.  Carotids are equal with no audible bruits.  No lymphadenopathy or thyromegaly.  Heart: Regular.  Normal S1 and S2.  No S4. No S3. No murmur.  Chest: Symmetrical. Right upper chest dialysis catheter.  Lungs: Clear bilaterally without rales, wheezes nor rhonchi.  Abdomen: Soft, nontender.  No masses or bruits.  No organomegaly.  Normal bowel sounds.  Extremities: No cyanosis, clubbing or edema.  Distal pulses are easily palpable.  Left forearm AV fistula without palpable thrill or audible bruit,  Skin: Warm and dry and well perfused.  Neurologic: Alert and oriented ×3.  Cranial nerves II through XII are intact.  Psychiatric: Normal mood, affect & judgment.    DIAGNOSTIC DATA:    EKG: Sinus rhythm, early repolarization.    Labs:   Lab Results   Component Value Date     03/03/2022    K 4.2 03/03/2022    BUN 59 (H) 03/03/2022    CREATININE 6.8 (HH) 03/03/2022    EGFR 10.3 (L) 03/03/2022    WBC 9.50 03/03/2022    HGB 11.4 (L) 03/03/2022     03/03/2022    AST 17 03/03/2022    ALT 21 03/03/2022    GLUCOSE 83 03/03/2022    INR 1.1 03/03/2022       Lipid Profile:   Lab Results   Component Value Date    CHOL 288 (H) 03/03/2022     Lab Results   Component Value Date    TRIG 116 03/03/2022     Lab Results   Component Value Date    HDL 87 03/03/2022     Lab Results   Component Value Date    LDLCALC 178 (H) 03/03/2022       Echocardiogram 1/12/2022: Performed at [facility]  Left Ventricle: Normal left ventricular size. Moderate left ventricular hypertrophy. Normal left ventricular systolic function. LV Ejection Fraction was 68 %.   Diastolic Function: Diastolic function indeterminate.   Right " Ventricle: Normal right ventricular systolic function. Normal right ventricular size. TAPSE 1.9 cm.   Left Atrium: The left atrium is normal in size.   Right Atrium: The right atrium is normal in size.   Atrial Septum: Not well visualized.   Mitral Valve: Mild mitral annular calcification. Trivial mitral regurgitation.   Aortic Valve: Trileaflet aortic valve. Aortic cusps appear mildly calcified. Mild aortic regurgitation.   Tricuspid Valve: Normal appearance of the tricuspid valve. There is mild tricuspid regurgitation.   Tricuspid regurgitation peak pressure gradient was 19 mmHg.   Pulmonic Valve: Normal appearance of the pulmonic valve. There is trace pulmonic regurgitation.   Pericardium: Small pericardial effusion. The pericardial effusion was circumferential. No echocardiographic evidence to suggest pericardial tamponade.   Aorta: All visualized segments of Aorta normal.   IVC: Normal size IVC with respiratory collapse consistent with a right atrial pressure of 3 mmHg. IVC diameter 1.0 cm.       IMPRESSION/RECOMMENDATIONS:  1. Preprocedural cardiovascular risk stratification prior to kidney transplantation - Ace has a history of hypertension and ESRD on dialysis since January 2022.  He has no history of CAD/MI, CVA or DM.  He is NYHA class I.  His echocardiogram from January 2022 showed preserved LV systolic function.  He is acceptable risk for listing.  I will arrainge for an exercise nuclear stress test. He will not take metoprolol on the evening prior to testing.    2. Hypertension - his blood pressure is on the low side but his MAP is > 70 mmHg.  He is on metoprolol and nifedipine.  He is no longer on hydrochlorothiazide.  This is followed by Miky Flower and Elmer Mercedes.   3. Moderate left ventricular hypertrophy - this is due to long standing hypertension.    4. Mild aortic calcification - his echocardiogram in January 2022 showed his aortic cusps appear mildly calcified. He only has mild aortic  regurgitation.   5. Covid-19 infection in January 2022 - he fortunately did not require intubation.    6. Anemia of CKD - he previously was on Velphoro.  This is followed by Miky Flower.  7. ESRD on dialysis - this is secondary to hypertensive nephrosclerosis.  He started dialysis in January 2022 when he was hospitalized with COVID.  This is followed by Miky Flower.    Leo, thank you for allowing me to share in the care of your patient.   I asked Ace to return in 1 year for in-house transplant cardiac clearance but to follow with his primary cardiologist, Dr. Elmer Mercedes, for all other cardiac needs.  If you have any further questions, please do not hesitate to contact me.    Sincerely,    OTTO Espinoza / Jesse Hicks D.O., [facility]    I, WILLEM Granger.O., personally saw and examined the patient and helped to formulate the plan outlined above. I reviewed available interim notes, studies, labs and communications where appropriate.  Edits were made where appropriate.                        Alyssa Reynolds CRNP  4/27/2022  8:54 PM  Signed  error

## 2022-04-28 NOTE — PROGRESS NOTES
"     Jesse Hicks D.O., [facility]    Clinical Cardiology and Heart Failure     [facility]     [facility]  The [facility] [address]  [address] [place] [address]     TEL  [phone]  [facility]       2022    Re:  Elaine Holguin  : 1965    Dear Leo Jerome MD:    Thank you for your referral of Elaine Holguin for pre-operative cardiovascular evaluation for the kidney transplant team.    \"Ace\" presented for his initial cardiovascular evaluation as he prepares for kidney transplant.  He follows with his nephrologist Miky Flower and his PCP/cardiologist Elmer Mercedes.  He has a history of hypertension, Covid-19 infection in 2022, and anemia of CKD.  He also has a history of ESRD secondary to hypertensive nephrosclerosis.  He started dialysis in 2022 when he was hospitalized with COVID.      He is NYHA class I.  He notes being sedentary since starting in dialysis earlier this year.  He denies angina or breathlessness with usual activity level.  He notes that his blood pressure has been low at times post dialysis.  He denies orthopnea, paroxysmal nocturnal dyspnea, abdominal bloating, peripheral edema, palpitations, presyncope, syncope, stroke, TIA or claudication.    PAST MEDICAL HISTORY:  All aspects of this documentation have been updated and/or entered into our EHR.  hypertension, Covid-19 infection in 2022, anemia of CKD, ESRD secondary to hypertensive nephrosclerosis, left upper extremity fistula.    SOCIAL HISTORY: He is  to his wife Bella. He has no children.  He is on disability.  He formerly worked at the  at a [employer].  He never smoked tobacco.  He does not drink alcohol.    FAMILY HISTORY: His mother is  from a stroke.  His father is  from an unknown type of cancer.  He has 5 brothers, 3 of which are  from unknown causes and other 2 with " "unknown medical history.  He also has 3 sisters who all are alive with unknown medical history.    MEDICATIONS:     Outpatient Encounter Medications as of 4/28/2022:   •  cholecalciferol (VITAMIN D3) 250 mcg (10,000 unit) capsule, Take 1 capsule (10,000 Units total) by mouth daily.  •  gabapentin (NEURONTIN) 100 mg capsule, Take 100 mg by mouth nightly.  •  metoprolol succinate XL (TOPROL-XL) 100 mg 24 hr tablet, TAKE ONE TABLET BY MOUTH ONCE DAILY FOR BLOOD PRESSURE  •  NIFEdipine XL (PROCARDIA XL) 30 mg 24 hr tablet, Take 30 mg by mouth daily.  •  [DISCONTINUED] calcitrioL (ROCALTROL) 0.5 mcg capsule, Take 1 mcg by mouth every other day.  •  [DISCONTINUED] cinacalcet (SENSIPAR) 30 mg tablet,   •  [DISCONTINUED] hydrochlorothiazide (HYDRODIURIL) 50 mg tablet, Take 50 mg by mouth daily.  •  [DISCONTINUED] lidocaine-prilocaine (EMLA) cream, APPLY SMALL AMOUNT TO ACCESS SITE (AVF) 1 TO 2 HOURS BEFORE DIALYSIS. COVER WITH OCCLUSIVE DRESSING (SARAN WRAP)  •  [DISCONTINUED] sucroferric oxyhydroxide (VELPHORO) 500 mg tablet,chewable, CRUSH OR CHEW AND SWALLOW 1 TABLET 3 TIMES A DAY WITH MEALS    ALLERGIES: No known drug allergies    REVIEW OF SYSTEMS: Aside from what is mentioned above, a 14 point review of systems was performed and was negative.    PHYSICAL EXAMINATION:  Visit Vitals  BP 98/64   Pulse 93   Resp 16   Ht 1.753 m (5' 9\")   Wt 103 kg (226 lb 12.8 oz)   SpO2 98%   BMI 33.49 kg/m²   Body surface area is 2.24 meters squared.   MAP 75 mmHg  General: Pleasant and in no acute distress.  HEENT: No corneal arcus or xanthelasmas.  Sclerae are anicteric.  Nares patent.  Mucous membranes moist.  Neck: Supple.  JVP is 5 cm/H2O.  Carotids are equal with no audible bruits.  No lymphadenopathy or thyromegaly.  Heart: Regular.  Normal S1 and S2.  No S4. No S3. No murmur.  Chest: Symmetrical. Right upper chest dialysis catheter.  Lungs: Clear bilaterally without rales, wheezes nor rhonchi.  Abdomen: Soft, nontender.  No " masses or bruits.  No organomegaly.  Normal bowel sounds.  Extremities: No cyanosis, clubbing or edema.  Distal pulses are easily palpable.  Left forearm AV fistula without palpable thrill or audible bruit,  Skin: Warm and dry and well perfused.  Neurologic: Alert and oriented ×3.  Cranial nerves II through XII are intact.  Psychiatric: Normal mood, affect & judgment.    DIAGNOSTIC DATA:    EKG: Sinus rhythm, early repolarization.    Labs:   Lab Results   Component Value Date     03/03/2022    K 4.2 03/03/2022    BUN 59 (H) 03/03/2022    CREATININE 6.8 (HH) 03/03/2022    EGFR 10.3 (L) 03/03/2022    WBC 9.50 03/03/2022    HGB 11.4 (L) 03/03/2022     03/03/2022    AST 17 03/03/2022    ALT 21 03/03/2022    GLUCOSE 83 03/03/2022    INR 1.1 03/03/2022       Lipid Profile:   Lab Results   Component Value Date    CHOL 288 (H) 03/03/2022     Lab Results   Component Value Date    TRIG 116 03/03/2022     Lab Results   Component Value Date    HDL 87 03/03/2022     Lab Results   Component Value Date    LDLCALC 178 (H) 03/03/2022       Echocardiogram 1/12/2022: Performed at [facility]  Left Ventricle: Normal left ventricular size. Moderate left ventricular hypertrophy. Normal left ventricular systolic function. LV Ejection Fraction was 68 %.   Diastolic Function: Diastolic function indeterminate.   Right Ventricle: Normal right ventricular systolic function. Normal right ventricular size. TAPSE 1.9 cm.   Left Atrium: The left atrium is normal in size.   Right Atrium: The right atrium is normal in size.   Atrial Septum: Not well visualized.   Mitral Valve: Mild mitral annular calcification. Trivial mitral regurgitation.   Aortic Valve: Trileaflet aortic valve. Aortic cusps appear mildly calcified. Mild aortic regurgitation.   Tricuspid Valve: Normal appearance of the tricuspid valve. There is mild tricuspid regurgitation.   Tricuspid regurgitation peak pressure gradient was 19 mmHg.   Pulmonic Valve:  Normal appearance of the pulmonic valve. There is trace pulmonic regurgitation.   Pericardium: Small pericardial effusion. The pericardial effusion was circumferential. No echocardiographic evidence to suggest pericardial tamponade.   Aorta: All visualized segments of Aorta normal.   IVC: Normal size IVC with respiratory collapse consistent with a right atrial pressure of 3 mmHg. IVC diameter 1.0 cm.       IMPRESSION/RECOMMENDATIONS:  1. Preprocedural cardiovascular risk stratification prior to kidney transplantation - Ace has a history of hypertension and ESRD on dialysis since January 2022.  He has no history of CAD/MI, CVA or DM.  He is NYHA class I.  His echocardiogram from January 2022 showed preserved LV systolic function.  He is acceptable risk for listing.  I will arrainge for an exercise nuclear stress test. He will not take metoprolol on the evening prior to testing.    2. Hypertension - his blood pressure is on the low side but his MAP is > 70 mmHg.  He is on metoprolol and nifedipine.  He is no longer on hydrochlorothiazide.  This is followed by Miky Flower and Elmer Mercedes.   3. Moderate left ventricular hypertrophy - this is due to long standing hypertension.    4. Mild aortic calcification - his echocardiogram in January 2022 showed his aortic cusps appear mildly calcified. He only has mild aortic regurgitation.   5. Covid-19 infection in January 2022 - he fortunately did not require intubation.    6. Anemia of CKD - he previously was on Velphoro.  This is followed by Miky Flower.  7. ESRD on dialysis - this is secondary to hypertensive nephrosclerosis.  He started dialysis in January 2022 when he was hospitalized with COVID.  This is followed by Miky Flower.    Leo, thank you for allowing me to share in the care of your patient.   I asked Ace to return in 1 year for in-house transplant cardiac clearance but to follow with his primary cardiologist, Dr. Elmer Mercedes, for all other  cardiac needs.  If you have any further questions, please do not hesitate to contact me.    Sincerely,    OTTO Espinoza / Jesse Hicks D.O., [facility]    IJesse D.O., personally saw and examined the patient and helped to formulate the plan outlined above. I reviewed available interim notes, studies, labs and communications where appropriate.  Edits were made where appropriate.

## 2022-04-28 NOTE — ASSESSMENT & PLAN NOTE
We have made arrangements to get him scheduled for a colonoscopy today. We went over the risks of the procedure, including bleeding and perforation, he understood and wishes to proceed.

## 2022-04-29 LAB
ABO + RH BLD: NORMAL
ANTI A1 LECTIN: POSITIVE
D AG BLD QL: POSITIVE
LABORATORY COMMENT REPORT: NORMAL

## 2022-05-06 ENCOUNTER — TELEPHONE (OUTPATIENT)
Dept: CARDIOLOGY | Facility: HOSPITAL | Age: 57
End: 2022-05-06
Payer: COMMERCIAL

## 2022-05-10 ENCOUNTER — HOSPITAL ENCOUNTER (OUTPATIENT)
Dept: CARDIOLOGY | Facility: HOSPITAL | Age: 57
Setting detail: NUCLEAR MEDICINE
Discharge: HOME | End: 2022-05-10
Attending: NURSE PRACTITIONER
Payer: COMMERCIAL

## 2022-05-10 VITALS
SYSTOLIC BLOOD PRESSURE: 142 MMHG | WEIGHT: 226 LBS | HEART RATE: 93 BPM | BODY MASS INDEX: 33.47 KG/M2 | HEIGHT: 69 IN | DIASTOLIC BLOOD PRESSURE: 76 MMHG | OXYGEN SATURATION: 100 % | RESPIRATION RATE: 16 BRPM

## 2022-05-10 DIAGNOSIS — Z76.82 KIDNEY TRANSPLANT CANDIDATE: ICD-10-CM

## 2022-05-10 LAB
CV NM TETROFOSMIN STRESS DOSE: 28.9 MCI
STRESS ANGINA INDEX: 0
STRESS BASELINE BP: NORMAL MMHG
STRESS BASELINE HR: 71 BPM
STRESS O2 SAT REST: 100 %
STRESS PERCENT HR: 104 %
STRESS POST ESTIMATED WORKLOAD: 10.4 METS
STRESS POST EXERCISE DUR MIN: 9 MIN
STRESS POST EXERCISE DUR SEC: 0 SEC
STRESS POST PEAK BP: NORMAL MMHG
STRESS POST PEAK HR: 171 BPM
STRESS TARGET HR: 140 BPM

## 2022-05-10 PROCEDURE — 93016 CV STRESS TEST SUPVJ ONLY: CPT | Performed by: INTERNAL MEDICINE

## 2022-05-10 PROCEDURE — A9502 TC99M TETROFOSMIN: HCPCS | Performed by: NURSE PRACTITIONER

## 2022-05-10 PROCEDURE — 93017 CV STRESS TEST TRACING ONLY: CPT

## 2022-05-10 PROCEDURE — 93018 CV STRESS TEST I&R ONLY: CPT | Performed by: INTERNAL MEDICINE

## 2022-05-10 PROCEDURE — 78451 HT MUSCLE IMAGE SPECT SING: CPT | Mod: 26 | Performed by: INTERNAL MEDICINE

## 2022-05-10 RX ADMIN — TETROFOSMIN 28.9 MILLICURIE: 1.38 INJECTION, POWDER, LYOPHILIZED, FOR SOLUTION INTRAVENOUS at 10:30

## 2022-05-10 NOTE — PROGRESS NOTES
"  Pharmacy Evaluation for Kidney Transplant Recipient    Wt Readings from Last 1 Encounters:   04/28/22 103 kg (226 lb 12.8 oz)             Ht Readings from Last 1 Encounters:   04/28/22 1.753 m (5' 9\")                 Current Outpatient Medications   Medication Sig Dispense Refill   • cholecalciferol (VITAMIN D3) 250 mcg (10,000 unit) capsule Take 1 capsule (10,000 Units total) by mouth daily. 90 capsule 3   • gabapentin (NEURONTIN) 100 mg capsule Take 100 mg by mouth nightly.     • metoprolol succinate XL (TOPROL-XL) 100 mg 24 hr tablet TAKE ONE TABLET BY MOUTH ONCE DAILY FOR BLOOD PRESSURE     • NIFEdipine XL (PROCARDIA XL) 30 mg 24 hr tablet Take 30 mg by mouth daily.       No current facility-administered medications for this visit.           No Known Allergies         Completed by Caty Gray PharmD   5/10/2022   10:24 AM    I have reviewed the patient's medication list and find no contraidications to  Transplant.    Reviewed by Caty Gray Pharm.D.  "

## 2022-05-17 NOTE — PROGRESS NOTES
"I have discussed the patient's care with the Resident. I have reviewed the patient's history and Resident's findings on exam, the patient's diagnosis/differential diagnosis and treatment plan. I concur with the treatment plan as documented by the Resident, except for my comments below or within additional notes today.  Nutritional Evaluation for Kidney Transplant Recipient    Wt Readings from Last 1 Encounters:   05/10/22 103 kg (226 lb)             Ht Readings from Last 1 Encounters:   05/10/22 1.753 m (5' 9\")           BMI Readings from Last 1 Encounters:   05/10/22 33.37 kg/m²                Lab Results   Component Value Date    CHOL 288 (H) 03/03/2022    TRIG 116 03/03/2022    HDL 87 03/03/2022    LDLCALC 178 (H) 03/03/2022       Recommendation:  No identified need for Nutritional Intervention at this time    Completed by:  JUANITA Hill   5/17/2022   2:06 PM                      "

## 2022-05-20 ENCOUNTER — TELEPHONE (OUTPATIENT)
Dept: COLORECTAL SURGERY | Facility: CLINIC | Age: 57
End: 2022-05-20
Payer: COMMERCIAL

## 2022-05-24 ENCOUNTER — APPOINTMENT (RX ONLY)
Dept: URBAN - METROPOLITAN AREA CLINIC 23 | Facility: CLINIC | Age: 57
Setting detail: DERMATOLOGY
End: 2022-05-24

## 2022-05-24 DIAGNOSIS — L28.0 LICHEN SIMPLEX CHRONICUS: ICD-10-CM

## 2022-05-24 DIAGNOSIS — L91.8 OTHER HYPERTROPHIC DISORDERS OF THE SKIN: ICD-10-CM

## 2022-05-24 DIAGNOSIS — L72.0 EPIDERMAL CYST: ICD-10-CM

## 2022-05-24 PROBLEM — D48.5 NEOPLASM OF UNCERTAIN BEHAVIOR OF SKIN: Status: ACTIVE | Noted: 2022-05-24

## 2022-05-24 PROCEDURE — ? BENIGN DESTRUCTION COSMETIC

## 2022-05-24 PROCEDURE — 99203 OFFICE O/P NEW LOW 30 MIN: CPT

## 2022-05-24 PROCEDURE — ? DEFER

## 2022-05-24 PROCEDURE — ? PRESCRIPTION

## 2022-05-24 PROCEDURE — ? ADDITIONAL NOTES

## 2022-05-24 PROCEDURE — ? TREATMENT REGIMEN

## 2022-05-24 PROCEDURE — ? COUNSELING

## 2022-05-24 RX ORDER — CLOBETASOL PROPIONATE 0.5 MG/G
1 OINTMENT TOPICAL
Qty: 45 | Refills: 1 | Status: ERX | COMMUNITY
Start: 2022-05-24

## 2022-05-24 RX ADMIN — CLOBETASOL PROPIONATE 1: 0.5 OINTMENT TOPICAL at 00:00

## 2022-05-24 ASSESSMENT — LOCATION DETAILED DESCRIPTION DERM
LOCATION DETAILED: LEFT SUPERIOR CENTRAL MALAR CHEEK
LOCATION DETAILED: LEFT LATERAL SUPERIOR TARSAL REGION
LOCATION DETAILED: LEFT INFERIOR POSTAURICULAR SKIN
LOCATION DETAILED: RIGHT INFERIOR POSTAURICULAR SKIN

## 2022-05-24 ASSESSMENT — LOCATION SIMPLE DESCRIPTION DERM
LOCATION SIMPLE: SCALP
LOCATION SIMPLE: LEFT CHEEK
LOCATION SIMPLE: LEFT LATERAL SUPERIOR TARSAL REGION

## 2022-05-24 ASSESSMENT — LOCATION ZONE DERM
LOCATION ZONE: FACE
LOCATION ZONE: EYELID
LOCATION ZONE: SCALP

## 2022-05-24 NOTE — PROCEDURE: BENIGN DESTRUCTION COSMETIC
Detail Level: Detailed
Consent: The patient's consent was obtained including but not limited to risks of crusting, scabbing, blistering, scarring, darker or lighter pigmentary change, recurrence, incomplete removal and infection.
Price (Use Numbers Only, No Special Characters Or $): 0
Post-Care Instructions: I reviewed with the patient in detail post-care instructions. Patient is to wear sunprotection, and avoid picking at any of the treated lesions. Pt may apply Vaseline to crusted or scabbing areas.
Anesthesia Volume In Cc: 0.3

## 2022-05-24 NOTE — PROCEDURE: TREATMENT REGIMEN
Detail Level: Detailed
Initiate Treatment: clobetasol 0.05 % ointment, Apply to affected areas twice daily x 4 weeks, then discontinue for 2 weeks

## 2022-05-24 NOTE — PROCEDURE: DEFER
Instructions (Optional): Dr. Mcclure, oculoplastic surgeon in [place] Instructions (Optional): Dr. Mcclure, oculoplastic surgeon in [place]

## 2022-06-02 ENCOUNTER — TELEPHONE (OUTPATIENT)
Dept: CARDIOLOGY | Facility: CLINIC | Age: 57
End: 2022-06-02
Payer: COMMERCIAL

## 2022-06-02 NOTE — TELEPHONE ENCOUNTER
I called and spoke with Ace.  I confirmed that he is up to date on testing from a cardiovascular standpoint for the kidney transplant team.  He had an echo in Jan 2022 at [facility] and the recent stress test on 5/10/2022.  I faxed a copy of the stress test result to Dr. Mercedes.  If Dr. Mercedes requires additional testing that would be at his discretion.  I confirmed that the transplant team had requested Dr. Mercedes's last office visit note and a copy of his prior stress testing and echo but this has not been received.  He appreciated the phone call.    CD -  I confirmed with Ace that he is up to date on testing from a cardiovascular standpoint for the kidney transplant team.  He had an echo in Jan 2022 at [facility] and the recent stress test on 5/10/2022.  I sent a copy of his stress test to Dr. Mercedes at his request.

## 2022-06-08 ENCOUNTER — TELEPHONE (OUTPATIENT)
Dept: COLORECTAL SURGERY | Facility: CLINIC | Age: 57
End: 2022-06-08
Payer: COMMERCIAL

## 2022-06-16 ENCOUNTER — APPOINTMENT (RX ONLY)
Dept: URBAN - METROPOLITAN AREA CLINIC 23 | Facility: CLINIC | Age: 57
Setting detail: DERMATOLOGY
End: 2022-06-16

## 2022-06-16 DIAGNOSIS — L91.8 OTHER HYPERTROPHIC DISORDERS OF THE SKIN: ICD-10-CM

## 2022-06-16 DIAGNOSIS — L28.0 LICHEN SIMPLEX CHRONICUS: ICD-10-CM | Status: IMPROVED

## 2022-06-16 PROCEDURE — ? COUNSELING

## 2022-06-16 PROCEDURE — ? ADDITIONAL NOTES

## 2022-06-16 PROCEDURE — 99213 OFFICE O/P EST LOW 20 MIN: CPT

## 2022-06-16 PROCEDURE — ? TREATMENT REGIMEN

## 2022-06-16 ASSESSMENT — LOCATION SIMPLE DESCRIPTION DERM
LOCATION SIMPLE: SCALP
LOCATION SIMPLE: LEFT CHEEK

## 2022-06-16 ASSESSMENT — LOCATION DETAILED DESCRIPTION DERM
LOCATION DETAILED: LEFT SUPERIOR CENTRAL MALAR CHEEK
LOCATION DETAILED: LEFT INFERIOR POSTAURICULAR SKIN
LOCATION DETAILED: RIGHT INFERIOR POSTAURICULAR SKIN

## 2022-06-16 ASSESSMENT — LOCATION ZONE DERM
LOCATION ZONE: FACE
LOCATION ZONE: SCALP

## 2022-06-16 NOTE — PROCEDURE: TREATMENT REGIMEN
Plan: Discussed PIH will persist for weeks to months, but will resolve if inflammation resolves. Patient states it likely was a neck pillow he used at night, started after using it, has since discontinue use.
Detail Level: Detailed
Continue Regimen: clobetasol 0.05 % ointment, Apply to affected areas twice daily x 4 weeks (one more week form today), then discontinue for 2 weeks

## 2022-07-07 ENCOUNTER — APPOINTMENT (RX ONLY)
Dept: URBAN - METROPOLITAN AREA CLINIC 23 | Facility: CLINIC | Age: 57
Setting detail: DERMATOLOGY
End: 2022-07-07

## 2022-07-07 DIAGNOSIS — L28.0 LICHEN SIMPLEX CHRONICUS: ICD-10-CM

## 2022-07-07 PROCEDURE — 99213 OFFICE O/P EST LOW 20 MIN: CPT

## 2022-07-07 PROCEDURE — ? TREATMENT REGIMEN

## 2022-07-07 PROCEDURE — ? COUNSELING

## 2022-07-07 ASSESSMENT — LOCATION SIMPLE DESCRIPTION DERM: LOCATION SIMPLE: SCALP

## 2022-07-07 ASSESSMENT — LOCATION ZONE DERM: LOCATION ZONE: SCALP

## 2022-07-07 ASSESSMENT — LOCATION DETAILED DESCRIPTION DERM
LOCATION DETAILED: LEFT INFERIOR POSTAURICULAR SKIN
LOCATION DETAILED: RIGHT INFERIOR POSTAURICULAR SKIN

## 2022-07-07 NOTE — PROCEDURE: TREATMENT REGIMEN
Plan: Discussed chronic nature of LSC, will treat with 2 treatment cycles and re-eval in 5 weeks.
Detail Level: Detailed
Initiate Treatment: (Restart) clobetasol 0.05% oint bid x 2 weeks on/2 weeks off

## 2022-07-27 ENCOUNTER — TELEPHONE (OUTPATIENT)
Dept: COLORECTAL SURGERY | Facility: CLINIC | Age: 57
End: 2022-07-27
Payer: COMMERCIAL

## 2022-08-18 ENCOUNTER — APPOINTMENT (RX ONLY)
Dept: URBAN - METROPOLITAN AREA CLINIC 23 | Facility: CLINIC | Age: 57
Setting detail: DERMATOLOGY
End: 2022-08-18

## 2022-08-18 DIAGNOSIS — L28.0 LICHEN SIMPLEX CHRONICUS: ICD-10-CM | Status: RESOLVED

## 2022-08-18 PROCEDURE — 99213 OFFICE O/P EST LOW 20 MIN: CPT

## 2022-08-18 PROCEDURE — ? TREATMENT REGIMEN

## 2022-08-18 PROCEDURE — ? COUNSELING

## 2022-08-24 ENCOUNTER — TELEPHONE (OUTPATIENT)
Dept: COLORECTAL SURGERY | Facility: CLINIC | Age: 57
End: 2022-08-24
Payer: COMMERCIAL

## 2022-08-29 ENCOUNTER — TELEPHONE (OUTPATIENT)
Dept: COLORECTAL SURGERY | Facility: CLINIC | Age: 57
End: 2022-08-29

## 2022-08-29 NOTE — TELEPHONE ENCOUNTER
DB spoke to Mr. Holguin.  He CONFIRMED time of FFC.  Requested check in instructions to be mailed.

## 2022-11-16 NOTE — PROCEDURE: ADDITIONAL NOTES
Render Risk Assessment In Note?: no
Additional Notes: One lesion treated with electrocautery to test result. Patient will F/U if further treatment is desired.
Detail Level: Detailed
<-- Click to add NO significant Past Surgical History

## 2023-03-10 ENCOUNTER — TELEPHONE (OUTPATIENT)
Dept: TRANSPLANT | Facility: HOSPITAL | Age: 58
End: 2023-03-10
Payer: COMMERCIAL

## 2024-04-25 ENCOUNTER — APPOINTMENT (RX ONLY)
Dept: URBAN - METROPOLITAN AREA CLINIC 23 | Facility: CLINIC | Age: 59
Setting detail: DERMATOLOGY
End: 2024-04-25

## 2024-04-25 DIAGNOSIS — D22 MELANOCYTIC NEVI: ICD-10-CM

## 2024-04-25 PROBLEM — D48.5 NEOPLASM OF UNCERTAIN BEHAVIOR OF SKIN: Status: ACTIVE | Noted: 2024-04-25

## 2024-04-25 PROCEDURE — ? BIOPSY BY SHAVE METHOD

## 2024-04-25 PROCEDURE — 11102 TANGNTL BX SKIN SINGLE LES: CPT

## 2024-04-25 ASSESSMENT — LOCATION SIMPLE DESCRIPTION DERM: LOCATION SIMPLE: LEFT ZYGOMA

## 2024-04-25 ASSESSMENT — LOCATION ZONE DERM: LOCATION ZONE: FACE

## 2024-04-25 ASSESSMENT — LOCATION DETAILED DESCRIPTION DERM: LOCATION DETAILED: LEFT MEDIAL ZYGOMA

## 2024-09-17 ENCOUNTER — APPOINTMENT (RX ONLY)
Dept: URBAN - METROPOLITAN AREA CLINIC 362 | Facility: CLINIC | Age: 59
Setting detail: DERMATOLOGY
End: 2024-09-17

## 2024-09-17 DIAGNOSIS — L72.0 EPIDERMAL CYST: ICD-10-CM

## 2024-09-17 DIAGNOSIS — L28.1 PRURIGO NODULARIS: ICD-10-CM

## 2024-09-17 PROBLEM — D23.61 OTHER BENIGN NEOPLASM OF SKIN OF RIGHT UPPER LIMB, INCLUDING SHOULDER: Status: ACTIVE | Noted: 2024-09-17

## 2024-09-17 PROCEDURE — ? INTRALESIONAL KENALOG

## 2024-09-17 PROCEDURE — 99212 OFFICE O/P EST SF 10 MIN: CPT | Mod: 25

## 2024-09-17 PROCEDURE — 11901 INJECT SKIN LESIONS >7: CPT

## 2024-09-17 PROCEDURE — ? COUNSELING

## 2024-09-17 ASSESSMENT — LOCATION DETAILED DESCRIPTION DERM
LOCATION DETAILED: LEFT INFERIOR UPPER BACK
LOCATION DETAILED: LEFT MID-UPPER BACK
LOCATION DETAILED: RIGHT MEDIAL UPPER BACK
LOCATION DETAILED: LEFT INFERIOR MEDIAL UPPER BACK
LOCATION DETAILED: RIGHT INFERIOR LATERAL UPPER BACK
LOCATION DETAILED: RIGHT INFERIOR UPPER BACK
LOCATION DETAILED: RIGHT INFERIOR MEDIAL UPPER BACK
LOCATION DETAILED: RIGHT MID-UPPER BACK

## 2024-09-17 ASSESSMENT — LOCATION SIMPLE DESCRIPTION DERM
LOCATION SIMPLE: RIGHT UPPER BACK
LOCATION SIMPLE: LEFT UPPER BACK

## 2024-09-17 ASSESSMENT — LOCATION ZONE DERM: LOCATION ZONE: TRUNK

## 2024-09-17 NOTE — HPI: SKIN LESIONS
Is This A New Presentation, Or A Follow-Up?: Skin Lesions
Additional History: Patient tried alcohol and OTC hydrocortisone, which didn’t help much.

## 2024-09-17 NOTE — PROCEDURE: INTRALESIONAL KENALOG
How Many Mls Were Removed From The 40 Mg/Ml (5ml) Vial When Preparing The Injectable Solution?: 0
Consent: The risks of atrophy were reviewed with the patient.
Lot # For Kenalog (Optional): [id number]
Administered By (Optional): Chandu Coronado PA-C
Ndc# For Kenalog Only: NDC #: 7737-2628-87
Medical Necessity Clause: This procedure was medically necessary because the lesions that were treated were:
Concentration Of Kenalog Solution Injected (Mg/Ml): 10.0
Detail Level: Detailed
Total Volume (Ccs): 0.1
Require Ndc Code?: No
Validate Note Data When Using Inventory: Yes
Kenalog Type Of Vial: Multiple Dose
Kenalog Preparation: Kenalog
Expiration Date For Kenalog (Optional): MAR 2026